# Patient Record
Sex: MALE | Race: WHITE
[De-identification: names, ages, dates, MRNs, and addresses within clinical notes are randomized per-mention and may not be internally consistent; named-entity substitution may affect disease eponyms.]

---

## 2019-10-28 ENCOUNTER — HOSPITAL ENCOUNTER (EMERGENCY)
Dept: HOSPITAL 25 - ED | Age: 19
LOS: 1 days | Discharge: HOME | End: 2019-10-29
Payer: COMMERCIAL

## 2019-10-28 DIAGNOSIS — K52.9: Primary | ICD-10-CM

## 2019-10-28 LAB
BASOPHILS # BLD AUTO: 0 10^3/UL (ref 0–0.2)
EOSINOPHIL # BLD AUTO: 0 10^3/UL (ref 0–0.6)
HCT VFR BLD AUTO: 47 % (ref 42–52)
HGB BLD-MCNC: 16 G/DL (ref 14–18)
LYMPHOCYTES # BLD AUTO: 0.2 10^3/UL (ref 1–4.8)
MCH RBC QN AUTO: 31 PG (ref 27–31)
MCHC RBC AUTO-ENTMCNC: 34 G/DL (ref 31–36)
MCV RBC AUTO: 92 FL (ref 80–94)
MONOCYTES # BLD AUTO: 0.6 10^3/UL (ref 0–0.8)
NEUTROPHILS # BLD AUTO: 10.8 10^3/UL (ref 1.5–7.7)
NRBC # BLD AUTO: 0 10^3/UL
NRBC BLD QL AUTO: 0.1
PLATELET # BLD AUTO: 280 10^3/UL (ref 150–450)
RBC # BLD AUTO: 5.1 10^6 /UL (ref 4.18–5.48)
WBC # BLD AUTO: 11.7 10^3/UL (ref 3.5–10.8)

## 2019-10-28 PROCEDURE — 36415 COLL VENOUS BLD VENIPUNCTURE: CPT

## 2019-10-28 PROCEDURE — 83605 ASSAY OF LACTIC ACID: CPT

## 2019-10-28 PROCEDURE — 96374 THER/PROPH/DIAG INJ IV PUSH: CPT

## 2019-10-28 PROCEDURE — 99283 EMERGENCY DEPT VISIT LOW MDM: CPT

## 2019-10-28 PROCEDURE — 96376 TX/PRO/DX INJ SAME DRUG ADON: CPT

## 2019-10-28 PROCEDURE — 85025 COMPLETE CBC W/AUTO DIFF WBC: CPT

## 2019-10-28 PROCEDURE — 83690 ASSAY OF LIPASE: CPT

## 2019-10-28 PROCEDURE — 96375 TX/PRO/DX INJ NEW DRUG ADDON: CPT

## 2019-10-28 PROCEDURE — 96361 HYDRATE IV INFUSION ADD-ON: CPT

## 2019-10-28 PROCEDURE — 80053 COMPREHEN METABOLIC PANEL: CPT

## 2019-10-28 PROCEDURE — 86140 C-REACTIVE PROTEIN: CPT

## 2019-10-28 NOTE — ED
Abdominal Pain/Male





- HPI Summary


HPI Summary: 





18 yo male presents with vomiting, diarrhea, and abdominal pain. He tells me 

that he ate chicken wings and broccoli from the [place] [employer] around 1800 

this evening. Around 2200 he developed upper abdominal cramping and nausea. Had 

an episode of diarrhea and began vomiting. He took tylenol, but vomited again 

soon after. He called EMS due to vomiting and diarrhea for 2-3 hours this 

evening. Currently he is still vomiting every 20-30min, but has had not more 

loose stools. Endorses pain just above the umbilicus that is cramping in 

nature. Denies recent illness, fever, chills, SOB, chest pain, dysuria, back 

pain. 





- History of Current Complaint


Chief Complaint: EDAbdPain


Stated Complaint: GENERAL ILLNESS PER EMS


Time Seen by Provider: 10/28/19 23:13


Hx Obtained From: Patient


Onset/Duration: Sudden Onset


Severity Initially: Mild


Severity Currently: Mild


Pain Intensity: 3


Pain Scale Used: 0-10 Numeric





- Allergies/Home Medications


Allergies/Adverse Reactions: 


 Allergies











Allergy/AdvReac Type Severity Reaction Status Date / Time


 


No Known Allergies Allergy   Verified 10/28/19 22:48














PMH/Surg Hx/FS Hx/Imm Hx


Cardiovascular History: 


   Denies: Hx Hypotension, Hx Hypertension


Respiratory History: 


   Denies: Hx Asthma, Hx Chronic Obstructive Pulmonary Disease (COPD)


GI History: 


   Denies: Hx Diverticulosis, Hx Gall Bladder Disease, Hx Gastroesophageal 

Reflux Disease


Neurological History: 


   Denies: Hx CVA, Hx Headaches





- Surgical History


Surgical History: None





- Immunization History


Immunizations Up to Date: Yes


Infectious Disease History: No


Infectious Disease History: 


   Denies: Traveled Outside the [place] in Last 30 Days





- Family History


Known Family History: Positive: Non-Contributory





- Social History


Occupation: Student


Lives: Dormitory/Roommates


Alcohol Use: None


Substance Use Type: Reports: None


Smoking Status (MU): Never Smoked Tobacco





Review of Systems


Constitutional: Negative


Eyes: Negative


ENT: Negative


Cardiovascular: Negative


Respiratory: Negative


Positive: Abdominal Pain, Vomiting, Diarrhea, Nausea


Genitourinary: Negative


Musculoskeletal: Negative


Neurological: Negative


Psychological: Normal


All Other Systems Reviewed And Are Negative: No





Physical Exam





- Summary


Physical Exam Summary: 





 


GENERAL: NAD. WDWN. No pain distress.


SKIN: No rashes, sores, lesions, or open wounds.


NECK: Supple. Nontender. No lymphadenopathy. 


CHEST:  CTAB. No r/r/w. No accessory muscle use. Breathing comfortably and in 

no distress.


CV:  RRR. Pulses intact. Cap refill <2seconds


ABDOMEN:  Soft. NTTP. No distention or guarding. No CVA tenderness. Bowel 

sounds present


NEURO: Alert. 


PSYCH: Age appropriate behavior.


Triage Information Reviewed: Yes


Vital Signs On Initial Exam: 


 Initial Vitals











Temp Pulse Resp BP Pulse Ox


 


 98.2 F   78   20   119/79   100 


 


 10/28/19 22:20  10/28/19 22:20  10/28/19 22:20  10/28/19 22:20  10/28/19 22:20











Vital Signs Reviewed: Yes





Procedures





- Sedation


Patient Received Moderate/Deep Sedation with Procedure: No





Diagnostics





- Vital Signs


 Vital Signs











  Temp Pulse Resp BP Pulse Ox


 


 10/28/19 23:00   80    99


 


 10/28/19 22:39   78    100


 


 10/28/19 22:38     119/79 


 


 10/28/19 22:20  98.2 F  78  20  119/79  100














- Laboratory


Lab Results: 





 Laboratory Tests











  10/28/19 10/28/19 10/28/19





  23:38 23:40 23:40


 


WBC  11.7 H  


 


RBC  5.10  


 


Hgb  16.0  


 


Hct  47  


 


MCV  92  


 


MCH  31  


 


MCHC  34  


 


RDW  13  


 


Plt Count  280  


 


MPV  7.0 L  


 


Neut % (Auto)  92.1  


 


Lymph % (Auto)  2.1  


 


Mono % (Auto)  5.5  


 


Eos % (Auto)  0.2  


 


Baso % (Auto)  0.1  


 


Absolute Neuts (auto)  10.8 H  


 


Absolute Lymphs (auto)  0.2 L  


 


Absolute Monos (auto)  0.6  


 


Absolute Eos (auto)  0.0  


 


Absolute Basos (auto)  0.0  


 


Absolute Nucleated RBC  0.0  


 


Nucleated RBC %  0.1  


 


Sodium   135 


 


Potassium   4.1 


 


Chloride   103 


 


Carbon Dioxide   23 


 


Anion Gap   9 


 


BUN   16 


 


Creatinine   1.00 


 


Est GFR ( Amer)   114.1 


 


Est GFR (Non-Af Amer)   94.3 


 


BUN/Creatinine Ratio   16.0 


 


Glucose   148 H 


 


Lactic Acid    1.2


 


Calcium   10.3 


 


Total Bilirubin   1.60 H 


 


AST   25 


 


ALT   17 


 


Alkaline Phosphatase   48 


 


C-Reactive Protein   1.01 


 


Total Protein   7.9 


 


Albumin   4.8 


 


Globulin   3.1 


 


Albumin/Globulin Ratio   1.5 


 


Lipase   14 











Result Diagrams: 


 10/28/19 23:38





 10/28/19 23:40


Lab Statement: Any lab studies that have been ordered have been reviewed, and 

results considered in the medical decision making process.





Abdominal Pain Male Course/Dx





- Course


Course Of Treatment: In the ED course pt was given 1L NS, toradol, and zofran 

for his symptoms with complete resolution of his discomfort and vomiting. He 

states he is feeling much better. Exam is WNL without abdominal tenderness. 

Labs with slightly elevated WBC. Suspect gastroenteritis. He was given a po 

challenge with water and jello and tolerated well. Will dc with rx for zofran. 

Pt voiced understanding and agrees with the plan





- Diagnoses


Provider Diagnoses: 


 Gastroenteritis








Discharge ED





- Sign-Out/Discharge


Documenting (check all that apply): Patient Departure





- Discharge Plan


Condition: Stable


Disposition: HOME


Prescriptions: 


Ondansetron ODT TAB* [Zofran 4 MG Odt TAB*] 4 mg PO Q8H PRN #6 tab.odt


 PRN Reason: Nausea


Patient Education Materials:  Gastroenteritis (ED)


Referrals: 


No Primary Care Phys,NOPCP [Primary Care Provider] - 


Additional Instructions: 


If you develop a fever, shortness of breath, chest pain, new or worsening 

symptoms - please call your PCP or go to the ED immediately.


 


Advance your diet as tolerated starting with a BRAT diet of bananas, rice, 

applesauce, and toast





- Billing Disposition and Condition


Condition: STABLE


Disposition: Home

## 2019-10-29 VITALS — SYSTOLIC BLOOD PRESSURE: 110 MMHG | DIASTOLIC BLOOD PRESSURE: 64 MMHG

## 2019-10-29 LAB
ALBUMIN SERPL BCG-MCNC: 4.8 G/DL (ref 3.2–5.2)
ALBUMIN/GLOB SERPL: 1.5 {RATIO} (ref 1–3)
ALP SERPL-CCNC: 48 U/L (ref 34–104)
ALT SERPL W P-5'-P-CCNC: 17 U/L (ref 7–52)
ANION GAP SERPL CALC-SCNC: 9 MMOL/L (ref 2–11)
AST SERPL-CCNC: 25 U/L (ref 13–39)
BUN SERPL-MCNC: 16 MG/DL (ref 6–24)
BUN/CREAT SERPL: 16 (ref 8–20)
CALCIUM SERPL-MCNC: 10.3 MG/DL (ref 8.6–10.3)
CHLORIDE SERPL-SCNC: 103 MMOL/L (ref 101–111)
GLOBULIN SER CALC-MCNC: 3.1 G/DL (ref 2–4)
GLUCOSE SERPL-MCNC: 148 MG/DL (ref 70–100)
HCO3 SERPL-SCNC: 23 MMOL/L (ref 22–32)
POTASSIUM SERPL-SCNC: 4.1 MMOL/L (ref 3.5–5)
PROT SERPL-MCNC: 7.9 G/DL (ref 6.4–8.9)
SODIUM SERPL-SCNC: 135 MMOL/L (ref 135–145)

## 2019-11-05 ENCOUNTER — HOSPITAL ENCOUNTER (EMERGENCY)
Dept: HOSPITAL 25 - ED | Age: 19
Discharge: HOME | End: 2019-11-05
Payer: COMMERCIAL

## 2019-11-05 VITALS — DIASTOLIC BLOOD PRESSURE: 70 MMHG | SYSTOLIC BLOOD PRESSURE: 130 MMHG

## 2019-11-05 DIAGNOSIS — R42: ICD-10-CM

## 2019-11-05 DIAGNOSIS — R55: Primary | ICD-10-CM

## 2019-11-05 LAB
ALBUMIN SERPL BCG-MCNC: 4.3 G/DL (ref 3.2–5.2)
ALBUMIN/GLOB SERPL: 1.6 {RATIO} (ref 1–3)
ALP SERPL-CCNC: 52 U/L (ref 34–104)
ALT SERPL W P-5'-P-CCNC: 26 U/L (ref 7–52)
ANION GAP SERPL CALC-SCNC: 5 MMOL/L (ref 2–11)
AST SERPL-CCNC: 27 U/L (ref 13–39)
BASOPHILS # BLD AUTO: 0.1 10^3/UL (ref 0–0.2)
BUN SERPL-MCNC: 17 MG/DL (ref 6–24)
BUN/CREAT SERPL: 16.8 (ref 8–20)
CALCIUM SERPL-MCNC: 9.5 MG/DL (ref 8.6–10.3)
CHLORIDE SERPL-SCNC: 102 MMOL/L (ref 101–111)
EOSINOPHIL # BLD AUTO: 0.1 10^3/UL (ref 0–0.6)
GLOBULIN SER CALC-MCNC: 2.7 G/DL (ref 2–4)
GLUCOSE SERPL-MCNC: 129 MG/DL (ref 70–100)
HCO3 SERPL-SCNC: 28 MMOL/L (ref 22–32)
HCT VFR BLD AUTO: 42 % (ref 42–52)
HGB BLD-MCNC: 14.4 G/DL (ref 14–18)
LYMPHOCYTES # BLD AUTO: 2.1 10^3/UL (ref 1–4.8)
MCH RBC QN AUTO: 31 PG (ref 27–31)
MCHC RBC AUTO-ENTMCNC: 34 G/DL (ref 31–36)
MCV RBC AUTO: 91 FL (ref 80–94)
MONOCYTES # BLD AUTO: 0.7 10^3/UL (ref 0–0.8)
NEUTROPHILS # BLD AUTO: 6.4 10^3/UL (ref 1.5–7.7)
NRBC # BLD AUTO: 0 10^3/UL
NRBC BLD QL AUTO: 0.1
PLATELET # BLD AUTO: 345 10^3/UL (ref 150–450)
POTASSIUM SERPL-SCNC: 3.6 MMOL/L (ref 3.5–5)
PROT SERPL-MCNC: 7 G/DL (ref 6.4–8.9)
RBC # BLD AUTO: 4.65 10^6 /UL (ref 4.18–5.48)
RBC UR QL AUTO: (no result)
SODIUM SERPL-SCNC: 135 MMOL/L (ref 135–145)
TSH SERPL-ACNC: 3.71 MCIU/ML (ref 0.34–5.6)
WBC # BLD AUTO: 9.4 10^3/UL (ref 3.5–10.8)

## 2019-11-05 PROCEDURE — 81015 MICROSCOPIC EXAM OF URINE: CPT

## 2019-11-05 PROCEDURE — 96360 HYDRATION IV INFUSION INIT: CPT

## 2019-11-05 PROCEDURE — 80053 COMPREHEN METABOLIC PANEL: CPT

## 2019-11-05 PROCEDURE — 86140 C-REACTIVE PROTEIN: CPT

## 2019-11-05 PROCEDURE — 81003 URINALYSIS AUTO W/O SCOPE: CPT

## 2019-11-05 PROCEDURE — 85025 COMPLETE CBC W/AUTO DIFF WBC: CPT

## 2019-11-05 PROCEDURE — 84443 ASSAY THYROID STIM HORMONE: CPT

## 2019-11-05 PROCEDURE — 99282 EMERGENCY DEPT VISIT SF MDM: CPT

## 2019-11-05 PROCEDURE — 36415 COLL VENOUS BLD VENIPUNCTURE: CPT

## 2019-11-05 PROCEDURE — 93005 ELECTROCARDIOGRAM TRACING: CPT

## 2019-11-05 NOTE — ED
Syncope/Near Syncope





- HPI Summary


HPI Summary: 





Patient complains of one episode of near syncope, lightheadedness, seeing stars 

this evening.  Episode occurred while patient was taking shower after 

exercising.  Also states he had 2 episodes of left flank pain which lasted 

about 20 minutes, pain rated as mild.  Also states cloudy brown urine tonight.  

Denies LOC, trauma, fever, cough, sore throat, CP, SOB, N/V/D, abdominal pain, 

change in BM.  Medical history is none.  Denies prior history of syncope or 

kidney stones.  Positive family cardiac history.





- History Of Current Complaint


Time Seen by Provider: 11/05/19 21:18


Hx Obtained From: Patient


Onset/Duration: Sudden Onset, Lasting Minutes


Activity At Onset: Other


Associated Head Trauma: No


Aggravating Factor(s): Nothing


Alleviating Factor(s): Spontaneous Resolution


Associated Signs And Symptoms: Lightheadedness





- Allergies/Home Medications


Allergies/Adverse Reactions: 


 Allergies











Allergy/AdvReac Type Severity Reaction Status Date / Time


 


No Known Allergies Allergy   Verified 11/05/19 21:38














PMH/Surg Hx/FS Hx/Imm Hx


Endocrine/Hematology History: 


   Denies: Hx Anticoagulant Therapy


Cardiovascular History: 


   Denies: Hx Hypotension, Hx Hypertension


Respiratory History: 


   Denies: Hx Asthma, Hx Chronic Obstructive Pulmonary Disease (COPD)


GI History: 


   Denies: Hx Diverticulosis, Hx Gall Bladder Disease, Hx Gastroesophageal 

Reflux Disease


 History: 


   Denies: Hx Dialysis


Sensory History: 


   Denies: Hx Legally Blind


Opthamlomology History: 


   Denies: Hx Eye Prosthesis


EENT History: 


   Denies: Hx Deafness


Neurological History: 


   Denies: Hx CVA, Hx Headaches


Infectious Disease History: No


Infectious Disease History: 


   Denies: Traveled Outside the [place] in Last 30 Days





- Family History


Known Family History: Positive: Non-Contributory





- Social History


Alcohol Use: None


Substance Use Type: Reports: None


Smoking Status (MU): Never Smoked Tobacco





Review of Systems


Constitutional: Negative


Eyes: Negative


ENT: Negative


Cardiovascular: Negative


Respiratory: Negative


Gastrointestinal: Negative


Genitourinary: Negative


Musculoskeletal: Negative


Skin: Negative


Neurological: Other


Psychological: Normal


All Other Systems Reviewed And Are Negative: Yes





Physical Exam


Triage Information Reviewed: Yes


Vital Signs On Initial Exam: 


 Initial Vitals











Temp Pulse Resp BP Pulse Ox


 


 98.7 F   69   18   124/72   100 


 


 11/05/19 21:20  11/05/19 21:20  11/05/19 21:20  11/05/19 21:20  11/05/19 21:20











Vital Signs Reviewed: Yes


Appearance: Positive: Well-Appearing


Skin: Positive: Warm


Head/Face: Positive: Normal Head/Face Inspection


Eyes: Positive: Normal


Neck: Positive: Supple


Respiratory/Lung Sounds: Positive: Clear to Auscultation


Cardiovascular: Positive: Normal


Abdomen Description: Positive: Nontender


Musculoskeletal: Positive: Normal


Neurological: Positive: Normal


Psychiatric: Positive: Normal


AVPU Assessment: Alert





- Aren Coma Scale


Best Eye Response: 4 - Spontaneous


Best Motor Response: 6 - Obeys Commands


Best Verbal Response: 5 - Oriented


Coma Scale Total: 15





Procedures





- Sedation


Patient Received Moderate/Deep Sedation with Procedure: No





Diagnostics





- Vital Signs


 Vital Signs











  Temp Pulse Resp BP Pulse Ox


 


 11/05/19 21:20  98.7 F  69  18  124/72  100














- Laboratory


Result Diagrams: 


 11/05/19 21:37





 11/05/19 21:37


Lab Statement: Any lab studies that have been ordered have been reviewed, and 

results considered in the medical decision making process.





Course/Dx


Course Of Treatment: Patient complains of one episode of near syncope, 

lightheadedness, seeing stars this evening.  Episode occurred while patient was 

taking shower after exercising.  Also states he had 2 episodes of left flank 

pain which lasted about 20 minutes, pain rated as mild.  Also states cloudy 

brown urine tonight.  Denies LOC, trauma, fever, cough, sore throat, CP, SOB, N/

V/D, abdominal pain, change in BM.  Medical history is none.  Denies prior 

history of syncope or kidney stones.  Positive family cardiac history.  Vital 

signs within normal limits.  Labs unremarkable.  EKG sinus rhythm, rate of 67, 

no prior EKG on file.  Patient hydrated with 1 L normal saline.    Ambulated in 

the ED without near syncopal or syncopal episode.  Due to hematuria and mild 

left flank symptoms patient may have possible small kidney stone.  No prior 

history of same.  Advised patient to drink plenty of fluids and help flush 

possible small stone.  Advised patient return to the ED for any worsening 

symptoms.





- Diagnoses


Provider Diagnoses: 


 Near syncope








Discharge ED





- Sign-Out/Discharge


Documenting (check all that apply): Patient Departure





- Discharge Plan


Condition: Stable


Disposition: HOME


Patient Education Materials:  Near Syncope (ED)


Referrals: 


No Primary Care Phys,NOPCP [Primary Care Provider] - 


Additional Instructions: 


Follow-up with [facility].  Drink plenty of fluids to maintain hydration.  

Return to the ED for any worsening symptoms.





- Billing Disposition and Condition


Condition: STABLE


Disposition: Home